# Patient Record
Sex: FEMALE | Race: OTHER | HISPANIC OR LATINO | URBAN - METROPOLITAN AREA
[De-identification: names, ages, dates, MRNs, and addresses within clinical notes are randomized per-mention and may not be internally consistent; named-entity substitution may affect disease eponyms.]

---
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
## 2023-04-11 NOTE — OB RN TRIAGE NOTE - NS_CONTACTNUMBEROFSUPPORTPERSON_OBGYN_ALL_OB_FT
[id number] Topical Clindamycin Counseling: Patient counseled that this medication may cause skin irritation or allergic reactions.  In the event of skin irritation, the patient was advised to reduce the amount of the drug applied or use it less frequently.   The patient verbalized understanding of the proper use and possible adverse effects of clindamycin.  All of the patient's questions and concerns were addressed.

## 2023-05-05 NOTE — ED ADULT NURSE NOTE - CAS EDN DISCHARGE INTERVENTIONS
Completed.  Thanks.  Josh Coffey, , CAQ    
Received FAX in regards to Knee Scooter. Updated DME Diagnosis and order placed.  Luis Angel Kirk, LAT, ATC    
IV discontinued, cath removed intact